# Patient Record
Sex: FEMALE | Race: WHITE | URBAN - METROPOLITAN AREA
[De-identification: names, ages, dates, MRNs, and addresses within clinical notes are randomized per-mention and may not be internally consistent; named-entity substitution may affect disease eponyms.]

---

## 2017-04-26 ENCOUNTER — OFFICE VISIT (OUTPATIENT)
Dept: FAMILY MEDICINE CLINIC | Age: 37
End: 2017-04-26

## 2017-04-26 VITALS
BODY MASS INDEX: 33.71 KG/M2 | OXYGEN SATURATION: 96 % | DIASTOLIC BLOOD PRESSURE: 82 MMHG | SYSTOLIC BLOOD PRESSURE: 130 MMHG | RESPIRATION RATE: 16 BRPM | HEART RATE: 73 BPM | WEIGHT: 214.8 LBS | TEMPERATURE: 98.4 F | HEIGHT: 67 IN

## 2017-04-26 DIAGNOSIS — L71.9 ROSACEA: ICD-10-CM

## 2017-04-26 DIAGNOSIS — R03.0 ELEVATED BLOOD PRESSURE READING IN OFFICE WITHOUT DIAGNOSIS OF HYPERTENSION: ICD-10-CM

## 2017-04-26 DIAGNOSIS — Z13.29 SCREENING FOR THYROID DISORDER: ICD-10-CM

## 2017-04-26 DIAGNOSIS — Z13.89 SCREENING FOR BLOOD OR PROTEIN IN URINE: ICD-10-CM

## 2017-04-26 DIAGNOSIS — E55.9 VITAMIN D DEFICIENCY: Chronic | ICD-10-CM

## 2017-04-26 DIAGNOSIS — Z13.220 SCREENING, LIPID: ICD-10-CM

## 2017-04-26 DIAGNOSIS — Z86.39 HISTORY OF DIABETES MELLITUS, TYPE II: ICD-10-CM

## 2017-04-26 DIAGNOSIS — Z13.1 SCREENING FOR DIABETES MELLITUS: ICD-10-CM

## 2017-04-26 DIAGNOSIS — Z13.0 SCREENING FOR ENDOCRINE/METABOLIC/IMMUNITY DISORDERS: ICD-10-CM

## 2017-04-26 DIAGNOSIS — L98.9 SKIN LESION: ICD-10-CM

## 2017-04-26 DIAGNOSIS — Z13.21 ENCOUNTER FOR VITAMIN DEFICIENCY SCREENING: ICD-10-CM

## 2017-04-26 DIAGNOSIS — Z13.228 SCREENING FOR ENDOCRINE/METABOLIC/IMMUNITY DISORDERS: ICD-10-CM

## 2017-04-26 DIAGNOSIS — O24.419 GESTATIONAL DIABETES MELLITUS (GDM), ANTEPARTUM, GESTATIONAL DIABETES METHOD OF CONTROL UNSPECIFIED: ICD-10-CM

## 2017-04-26 DIAGNOSIS — Z00.00 ROUTINE ADULT HEALTH MAINTENANCE: Primary | ICD-10-CM

## 2017-04-26 DIAGNOSIS — Z13.29 SCREENING FOR ENDOCRINE/METABOLIC/IMMUNITY DISORDERS: ICD-10-CM

## 2017-04-26 RX ORDER — GLUCOSAMINE SULFATE 1500 MG
2000 POWDER IN PACKET (EA) ORAL DAILY
COMMUNITY
End: 2017-06-28 | Stop reason: DRUGHIGH

## 2017-04-26 NOTE — MR AVS SNAPSHOT
Visit Information Date & Time Provider Department Dept. Phone Encounter #  
 4/26/2017  2:30 PM Bren Client, [address] [place] [phone] [id number] Upcoming Health Maintenance Date Due  
 FOOT EXAM Q1 1/30/1991 MICROALBUMIN Q1 1/30/1991 Pneumococcal 19-64 Medium Risk (1 of 1 - PPSV23) 1/30/2000 DTaP/Tdap/Td series (1 - Tdap) 1/30/2002 PAP AKA CERVICAL CYTOLOGY 1/30/2002 HEMOGLOBIN A1C Q6M 4/1/2016 LIPID PANEL Q1 4/1/2016 INFLUENZA AGE 9 TO ADULT 8/1/2016 EYE EXAM RETINAL OR DILATED Q1 10/3/2017 Allergies as of 4/26/2017  Review Complete On: 4/26/2017 By: Roxanne Allen LPN No Known Allergies Current Immunizations  Reviewed on 4/26/2017 No immunizations on file. Reviewed by Bren Hall MD on 4/26/2017 at  8:31 AM  
 Reviewed by Bren Hall MD on 4/26/2017 at  3:03 PM  
You Were Diagnosed With   
  
 Codes Comments Routine adult health maintenance    -  Primary ICD-10-CM: Z00.00 ICD-9-CM: V70.0 Gestational diabetes mellitus (GDM), antepartum, gestational diabetes method of control unspecified     ICD-10-CM: O23.80 ICD-9-CM: 123.94 Elevated blood pressure reading in office without diagnosis of hypertension     ICD-10-CM: R03.0 ICD-9-CM: 796.2 Rosacea     ICD-10-CM: L71.9 ICD-9-CM: 695.3 Skin lesion     ICD-10-CM: L98.9 ICD-9-CM: 709.9 Vitamin D deficiency     ICD-10-CM: E55.9 ICD-9-CM: 268.9 Vitals BP Pulse Temp Resp Height(growth percentile) Weight(growth percentile) 130/82 73 98.4 °F (36.9 °C) (Oral) 16 5' 7\" (1.702 m) 214 lb 12.8 oz (97.4 kg) SpO2 Breastfeeding? BMI OB Status Smoking Status 96% No 33.64 kg/m2 IUD Never Smoker Vitals History BMI and BSA Data Body Mass Index Body Surface Area  
 33.64 kg/m 2 2.15 m 2 Preferred Pharmacy Pharmacy Name Phone [facility] [address] [facility], [address] [phone] Your Updated Medication List  
  
   
This list is accurate as of: 4/26/17  3:07 PM.  Always use your most recent med list.  
  
  
  
  
 ibuprofen 200 mg tablet Commonly known as:  MOTRIN Take  by mouth. PNV No12-Iron-FA-DSS-OM-3 29 mg iron-1 mg -50 mg Cpkd Take  by mouth. VITAMIN D3 1,000 unit Cap Generic drug:  cholecalciferol Take 2,000 Units by mouth daily. We Performed the Following  DIABETES FOOT EXAM [HM7 Custom] Comments:  
 Diabetic foot exam performed today. REFERRAL TO DERMATOLOGY [REF19 Custom] Comments:  
 [facility] location - Please evaluate patient for mole behind left ear (peeling), growth left underarm/rib cage, rosacea/acne on face. Fair skinned. To-Do List   
 04/26/2017 Lab:  CBC WITH AUTOMATED DIFF   
  
 04/26/2017 Lab:  HEMOGLOBIN A1C WITH EAG   
  
 04/26/2017 Lab:  LIPID PANEL   
  
 04/26/2017 Lab:  METABOLIC PANEL, COMPREHENSIVE   
  
 04/26/2017 Lab:  MICROALBUMIN, UR, RAND W/ MICROALBUMIN/CREA RATIO   
  
 04/26/2017 Lab:  TSH AND FREE T4   
  
 04/26/2017 Lab:  URINALYSIS W/ RFLX MICROSCOPIC   
  
 04/26/2017 Lab:  VITAMIN D, 25 HYDROXY Referral Information Referral ID Referred By Referred To  
  
 [id number] [facility] [address] [place] [address] [address] Phone: [phone] Fax: [phone] Visits Status Start Date End Date 1 New Request 4/26/17 4/26/18 If your referral has a status of pending review or denied, additional information will be sent to support the outcome of this decision. Patient Instructions STAY UP TO DATE WITH YOUR PREVENTIVE HEALTH:    
Please review the following recommendations and if you are not sure that your healthcare is up to date, ask your provider at your next scheduled office visit. -- Yearly preventive visits (sometimes called \"physicals\") are recommended for all adults. Medicare and Medicaid do not pay for physicals. Medicare covers a yearly wellness visit that differs in many ways from a traditional physical, but is an opportunity to make sure you are maximizing the preventive services that will be covered by Medicare. Each insurance carrier will have different regulations about what services may be covered, so be sure to talk with your insurance provider before scheduling a visit. -- Colon cancer screening is recommended for all patients 48 and older with either colonoscopy (interval will vary depending on results) or yearly stool testing.   
 
-- Mammogram is recommended every 2 years for women ages 36 to 76. -- Pap smear is recommended every 3-5 years for women aged 24 to 72, unless your cervix has been surgically removed for benign reasons. -- Flu shot is recommended every fall for adults of all ages. -- Prevnar 15 is recommended at the age of 72 for all adults. -- Pneumovax is recommended one year after Prevnar 13 for all adults, but earlier if you have a history of chronic health problems (including diabetes and asthma) or smoking. -- Tetanus boosters are recommended every 10 years for adults of all ages. Medicare and Medicaid do not cover tetanus as a preventive booster, but will pay for patients to receive a booster in the event of certain injuries. INSTRUCTIONS AFTER YOUR VISIT ON 4/26/2017  
 
-- FASTING LABS  (fasting means nothing by mouth except medications with water or black coffee for at least 10 hours) were ordered to be completed at your next earliest convenience. -- Our lab does not require a scheduled appointment. You can return to the office during lab hours within the next 1-2 weeks to complete your fasting labs. -- Lab hours at [facility] are 7a-3:00pm Monday-Friday.   Please check the holiday closures below to make sure the office is open on the day you plan to return. -- LAB RESULTS can be obtained by logging into your [employer] account. If you need assistance with accessing your account, you can call the [address] [place] [address] at #[phone]. 
 
-- REFERRALS - If you were referred to a specialist or consultant today, this often needs to be authorized by your insurance company before it can be scheduled. You should be contacted within 2 weeks by the consultant's office to schedule the visit. If you do not hear from the specialist's office in that time frame, please call my office and the staff here can check on the status of your referral.   
 
 
TESTING RESULTS  
-- Lab results may become available for your review on [employer]Life before your provider has an opportunity to write you a note about results. Review of routine lab results will generally be done in 10-14 days. Please save your inquiries about results until your provider has had time to review them. -- If you have testing done outside of the office, please call to let us know the date and location that your testing was completed. Results can often be obtained faster if an inquiry is run. MEDICATION REFILLS   
 
-- Controlled substance refills (medications that require printed prescriptions for monitoring of use per [facility] guidelines) must be picked up in the office and per medical group policy will require a scheduled office visit with the provider. Calling the after hours answering service to request a controlled substance represents a violation of [facility] controlled substance policy. -- All other medication refills are to be requested by calling your pharmacy during regular office hours. The after hours physician on call is not required to respond to calls about medication refills.   It is your responsibility to keep track of when you may be running out of medication and to place refill requests at least 3 business days prior. [address] Scheduling appointments can be done at the  or by calling [phone] and selecting option #1. HOLIDAY CLOSURES:  
 
The office is closed on six major holidays each year:  New Year's Day, July 4th, Memorial Day, Labor Day, Thanksgiving, and [place] Day. Lab and X-ray services are not available on those days. Introducing [facility]! Dear Asad Mcmullen: 
Thank you for requesting a [employer] account. Our records indicate that you have previously registered for a [employer] account but its currently inactive. Please call our [employer]oma support line at [phone]. Additional Information If you have questions, please visit the Frequently Asked Questions section of the [employer] website at [email or web address] Remember, [employer] is NOT to be used for urgent needs. For medical emergencies, dial 911. Now available from your iPhone and Android! Please provide this summary of care documentation to your next provider. Your primary care clinician is listed as Amna Vela. If you have any questions after today's visit, please call [phone].

## 2017-04-26 NOTE — PROGRESS NOTES
Amador Montero is a 40 y.o. female  Pt here today for follow up visit. 1. Have you been to the ER, urgent care clinic since your last visit? Hospitalized since your last visit? No    2. Have you seen or consulted any other health care providers outside of the [address] [place] [address] since your last visit? Include any pap smears or colon screening.  Yes Where: OB/GYN

## 2017-04-26 NOTE — PROGRESS NOTES
PRE-VISIT PLANNING:     Future Appointments  Date Time Provider Luis Michael   2017 2:30 PM Austin Singh MD [place] (PCP is Austin Singh MD) is scheduled for an office visit with Austin Singh MD on 2017 for diabetes, hasn't been seen at office in 17 months. Encounter opened prior to visit to complete pre-visit planning, update and review pertinent sections in the chart. Last office visit with PCP was 11/24/15. Rooming Nurse Items:  -- Release for last diabetic eye exam  -- Urine sample for microalbumin screening  -- Instruct patient to remove socks and shoes prior to provider entering room if they are willing to have diabetic foot exam    Pending items for provider to review with patient:  -- Update fasting labs  Health Maintenance Due   Topic Date Due    MICROALBUMIN Q1  1991    Pneumococcal 19-64 Medium Risk (1 of 1 - PPSV23) 2000    DTaP/Tdap/Td series (1 - Tdap) 2002    PAP AKA CERVICAL CYTOLOGY  2002    HEMOGLOBIN A1C Q6M  2016    LIPID PANEL Q1  2016    INFLUENZA AGE 9 TO ADULT  2016          Preventive Visit - Internal [address] [address] [facility] [address]    Date of Service:  2017  Patient's Name: Daniel Colvin   Patient's :  1980     Subjective/Discussion:       Chief Complaint   Patient presents with    Physical        Daniel Colvin is a 40 y.o. female seen today for routine preventive visit/annual physical.  She  has a past medical history of Diabetes mellitus, type 2 ([address] and Pregnancy (2015). Since her last OV here she delivered a healthy baby girl via repeat C/S (in February). She did have gestational diabetes which was managed with insulin, but she notes her BG normalized immediately after delivery and she has been off treatment for several months.   She still monitors BG occasionally and has not had any BG levels > 90. She has lost a significant amount of weight (down 27#s compared to 2015). She is still breastfeeding/pumping. She is back to work. Feeling well. No postpartum depression. States she did have TDAP with VB Ob/Gyn in 3rd trimester. Had eye exam recently. Has very fair skin and a hx of flushing easily, has had worsening of redness in face that is now there all the time. Sometimes has acne on cheeks also. Has a mole behind left ear that sometimes seems to peel or come off and a skin growth on left side at bra line that is raised and fairly large.       Review of Systems (positives in bold)   CONST:   fatigue, malaise, unintentional weight change, appetite change, fevers, chills, rigors  NEURO:   headaches, auras, vision changes, seizures,     dizziness, lightheadeness, orthostasis, loss of consciousness, confusion    numbness/tingling/sensory change, focal weakness, new gait difficulty/imbalance  HEENT:  itchy eyes, runny eyes, red eyes, eye watering or discharge,     vision changes, light sensitivity, double vision    ear pain, ear pressure/popping, ear discharge/drainage, hearing change    nosebleeds, sneezing, runny nose, nasal congestion, postnasal drip, altered sense of smell    sore throat, dry mouth,voice change, hoarse voice,      jaw pain, jaw popping, toothache, dysgeusia    difficulty swallowing, oral ulcers or canker sores  CV:      chest pain, palpitations, chest wall pain, orthopnea, PND, edema  PULM:  SOB, wheezing, cough, sputum production, hemoptysis  GI:             nausea, vomiting, dry heaves, abdominal pain,     diarrhea, constipation, greasy stools, blood in stool, pale stools  :       dysuria, frequency, urgency, hematuria, incontinence, flank pain      decreased urine output, dark urine color, malodorous urine, kidney stones  MS:      focal muscle pain, myalgias, soft tissue swelling,    focal joint pain, diffuse joint aches, joint swelling/redness,     decreased ROM, joint instability, joint crepitus    neck pain, back pain  SKIN:        rashes, itching, vesicles, pustules,     cuts, open sores, nonhealing wounds, drainage,      acne, rosacea, new skin growths, changing moles or growths, skin tags, warts  ALLERGY: seasonal allergies, itchy eyes, watery eyes, red eyes,     itchy ears, ear pain/pressure, ear popping,     runny nose, sneezing, postnasal drip, sore throat  HEME:  easy bleeding/bruising, bleeding from gums, history of DVT or PE, history of ICH  PSYCH: abnormal mood swings, anxiety, insomnia, hallucinations, anhedonia,       depression, suicidal ideation, homicidal ideation, feelings of hopelessness    substance dependence or abuse, disordered eating, irritability,    difficulty focusing, distractibility, obsessions, compulsions, repetitious behaviors     Health Maintenance   Topic Date Due    MICROALBUMIN Q1  01/30/1991    Pneumococcal 19-64 Medium Risk (1 of 1 - PPSV23) 01/30/2000    DTaP/Tdap/Td series (1 - Tdap) 01/30/2002    PAP AKA CERVICAL CYTOLOGY  01/30/2002    HEMOGLOBIN A1C Q6M  04/01/2016    LIPID PANEL Q1  04/01/2016    INFLUENZA AGE 9 TO ADULT  08/01/2016    EYE EXAM RETINAL OR DILATED Q1  10/03/2017    FOOT EXAM Q1  04/26/2018       No visits with results within 12 Month(s) from this visit.   Latest known visit with results is:    Office Visit on 11/24/2015   Component Date Value Ref Range Status    WBC 11/25/2015 9.2  3.4 - 10.8 x10E3/uL Final    RBC 11/25/2015 4.04  3.77 - 5.28 x10E6/uL Final    HGB 11/25/2015 12.7  11.1 - 15.9 g/dL Final    HCT 11/25/2015 37.7  34.0 - 46.6 % Final    MCV 11/25/2015 93  79 - 97 fL Final    MCH 11/25/2015 31.4  26.6 - 33.0 pg Final    MCHC 11/25/2015 33.7  31.5 - 35.7 g/dL Final    RDW 11/25/2015 13.3  12.3 - 15.4 % Final    PLATELET 45/33/3423 530  150 - 379 x10E3/uL Final    NEUTROPHILS 11/25/2015 67  % Final    Lymphocytes 11/25/2015 27  % Final    MONOCYTES 11/25/2015 6  % Final    EOSINOPHILS 11/25/2015 0  % Final    BASOPHILS 11/25/2015 0  % Final    ABS. NEUTROPHILS 11/25/2015 6.2  1.4 - 7.0 x10E3/uL Final    Abs Lymphocytes 11/25/2015 2.5  0.7 - 3.1 x10E3/uL Final    ABS. MONOCYTES 11/25/2015 0.5  0.1 - 0.9 x10E3/uL Final    ABS. EOSINOPHILS 11/25/2015 0.0  0.0 - 0.4 x10E3/uL Final    ABS. BASOPHILS 11/25/2015 0.0  0.0 - 0.2 x10E3/uL Final    IMMATURE GRANULOCYTES 11/25/2015 0  % Final    ABS. IMM. GRANS. 11/25/2015 0.0  0.0 - 0.1 x10E3/uL Final    Folate 11/25/2015 >19.9  >3.0 ng/mL Final    Comment: A serum folate concentration of less than 3.1 ng/mL is  considered to represent clinical deficiency.  VITAMIN D, 25-HYDROXY 11/25/2015 15.0* 30.0 - 100.0 ng/mL Final    Comment: Vitamin D deficiency has been defined by the [address] [place] [address] practice guideline as a  level of serum 25-OH vitamin D less than 20 ng/mL (1,2). The Endocrine Society went on to further define vitamin D  insufficiency as a level between 21 and 29 ng/mL (2). 1. IOM ([place] of [employer]). 2010. Dietary reference     intakes for calcium and D. [facility]: The     Asthmatracker. 2. Ann MF, Cindy NC, Priscilla PARSONS, et al.     Evaluation, treatment, and prevention of vitamin D     deficiency: an Endocrine Society clinical practice     guideline. JCEM. 2011 Jul; 96(7):1911-30.  TSH 11/25/2015 0.769  0.450 - 4.500 uIU/mL Final    Vitamin B12 11/25/2015 588  211 - 946 pg/mL Final       Physical Assessment:   VS:    Visit Vitals    /82    Pulse 73    Temp 98.4 °F (36.9 °C) (Oral)    Resp 16    Ht 5' 7\" (1.702 m)    Wt 214 lb 12.8 oz (97.4 kg)    SpO2 96%    Breastfeeding No    BMI 33.64 kg/m2     No exam data present    General:   Pleasant age appropriate female well-nourished, well-groomed,  conversant, alert, in no acute distress.      Head:  Normocephalic, atraumatic  Ears:  External ears WNL, no pain with movement of pinna, no TTP of mastoid processes    External auditory canals are clear    TMs WNL bilaterally with no bulging, or erythema, no medial effusions present  Eyes:  EOMI, PERRL, no pain with eye movements    No conjunctival injection, abnormal tearing, discharge, chemosis  Mouth:  MMM, o/p WNL without membranes, exudates, ulcerations or petechiae  Nose:  External nares WNL    Nasal turbinatesWNL  Neck:  Neck supple with normal ROM for age, no thyromegaly or nodules    no LAD  Cardiovasc:   Regular rate and rhythm, no murmurs, no rubs, no gallops  Pulmonary:   Clear breath sounds bilaterally, good air movement    No wheezing, no rales, no rhonchi, normal respiratory effort  Abdomen:   Abdomen soft, nontender, nondistended, NABS, no masses  Extremities:   No edema, no tenderness with palpation of calves, warm and well-perfused distally    No synovitis or pain with ROM of knees, ankles, hips, wrists, elbows, shoulders. Neuro:   Alert, conversant, appropriate, following commands, no focal deficits. DTRs 2+/symmetric at biceps, BRs, knees, ankles    Strength testing 5/5 in all major muscle groups    Gait/balance normal     Sensation normal to light touch distally  Skin:    Cheeks are flushed/erythematous, many freckles on face/neck, well-demarcated slightly raised waxy ~.8cm papule behind left ear with varied pigment. Fleshy light brown pigmented raised skin growth left side along bra line    Psych:  Affect pleasant, mood and judgment appropriate, facies congruent with affect,    Thought process demonstrated is linear and logical    Assessment/Plan:       William Coronado was seen today for routine preventive visit. Derm referral.  Update fasting labs. Age, gender and functional status appropriate preventive screenings/measures discussed with patient. Follow up yearly for preventive visit, sooner PRN. ICD-10-CM ICD-9-CM    1. Routine adult health maintenance Z00.00 V70.0    2.  Gestational diabetes mellitus (GDM), antepartum, gestational diabetes method of control unspecified O24.419 648.83 CBC WITH AUTOMATED DIFF      METABOLIC PANEL, COMPREHENSIVE      HEMOGLOBIN A1C WITH EAG      LIPID PANEL      URINALYSIS W/ RFLX MICROSCOPIC      VITAMIN D, 25 HYDROXY      MICROALBUMIN, UR, RAND W/ MICROALBUMIN/CREA RATIO      HM DIABETES FOOT EXAM      TSH AND FREE T4   3. History of diabetes mellitus, type II Z86.39 V12.29 HEMOGLOBIN A1C WITH EAG      MICROALBUMIN, UR, RAND W/ MICROALBUMIN/CREA RATIO      HM DIABETES FOOT EXAM   4. Lactating mother Z39.1 V24.1 PNV No12-Iron-FA-DSS-OM-3 29 mg iron-1 mg -50 mg CPKD   5. Elevated blood pressure reading in office without diagnosis of hypertension R03.0 796.2 CBC WITH AUTOMATED DIFF      METABOLIC PANEL, COMPREHENSIVE      URINALYSIS W/ RFLX MICROSCOPIC      TSH AND FREE T4   6. Rosacea L71.9 695.3 REFERRAL TO DERMATOLOGY   7. Skin lesion L98.9 709.9 REFERRAL TO DERMATOLOGY   8. Vitamin D deficiency E55.9 268.9 VITAMIN D, 25 HYDROXY      cholecalciferol (VITAMIN D3) 1,000 unit cap   9. Screening for endocrine/metabolic/immunity disorders Z13.29 V77.99 CBC WITH AUTOMATED DIFF    O93.312  METABOLIC PANEL, COMPREHENSIVE    Z13.0  HEMOGLOBIN A1C WITH EAG      LIPID PANEL      URINALYSIS W/ RFLX MICROSCOPIC      VITAMIN D, 25 HYDROXY      MICROALBUMIN, UR, RAND W/ MICROALBUMIN/CREA RATIO      TSH AND FREE T4   10. Screening for diabetes mellitus Z13.1 V77.1 HEMOGLOBIN A1C WITH EAG   11. Screening, lipid Z13.220 V77.91 LIPID PANEL   12. Screening for blood or protein in urine Z13.89 V82.9 URINALYSIS W/ RFLX MICROSCOPIC      MICROALBUMIN, UR, RAND W/ MICROALBUMIN/CREA RATIO   13. Screening for thyroid disorder Z13.29 V77.0 TSH AND FREE T4   14. Encounter for vitamin deficiency screening Z13.21 V77.99 VITAMIN D, 25 HYDROXY       Body mass index is 33.64 kg/(m^2). Discussed the patient's above normal BMI with her.   I have recommended the following interventions and follow up:  Weight loss from baseline weight. 30 minutes of cardiovascular exercise daily, reduction in simple carbohydrates, increase in dietary fiber, adequate water intake to stay hydrated/keep urine clear to light yellow. Follow up at next OV. The patient states understanding/agreement with the treatment plan. Patient's questions were answered. Kris Lign MD - Internal Medicine  4/26/2017, 2:51 PM  3 [facility]    Patient Care Team:  Kris Ling MD as PCP - General (Internal Medicine)  Danyelle Leslie MD as Consulting Provider (Obstetrics & Gynecology)  Rosemary Orr RN as Nurse Navigator (Internal Medicine)  Iris Mondragon MD as Consulting Provider (Maternal . Fetal Medicine)     Problem List:      Patient Active Problem List   Diagnosis Code    Controlled type 2 diabetes mellitus without complication, without long-term current use of insulin ([facility] E11.9    Pregnancy Z33.1    Vitamin D deficiency E55.9    Gestational diabetes mellitus (GDM), antepartum O24.419    Routine adult health maintenance Z00.00       Medications:     Current Outpatient Prescriptions   Medication Sig    PNV No12-Iron-FA-DSS-OM-3 29 mg iron-1 mg -50 mg CPKD Take  by mouth.  cholecalciferol (VITAMIN D3) 1,000 unit cap Take 2,000 Units by mouth daily.  ibuprofen (MOTRIN) 200 mg tablet Take  by mouth. No current facility-administered medications for this visit.         Additional History:     Past Medical History:   Diagnosis Date    Diabetes mellitus, type 2 ([facility]     New diagnosis 4/2015 with HbA1C of 7.7%    Pregnancy 11/24/2015    JUAN 7/12/15 based on LMP, Dr. Cedric Centeno. 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 Family History   Problem Relation Age of Onset    Diabetes Maternal Aunt      No Known Allergies         This document may have been created with the aid of dictation software. In spite of review and editing, text may contain errors, particularly phonetic errors.

## 2017-04-26 NOTE — PROGRESS NOTES
Pt does not recall Dr/location of eye exam. Pt took records release form home and will return with info for requesting records.

## 2017-04-26 NOTE — PATIENT INSTRUCTIONS
STAY UP TO DATE WITH YOUR PREVENTIVE HEALTH:     Please review the following recommendations and if you are not sure that your healthcare is up to date, ask your provider at your next scheduled office visit. -- Yearly preventive visits (sometimes called \"physicals\") are recommended for all adults. Medicare and Medicaid do not pay for physicals. Medicare covers a yearly wellness visit that differs in many ways from a traditional physical, but is an opportunity to make sure you are maximizing the preventive services that will be covered by Medicare. Each insurance carrier will have different regulations about what services may be covered, so be sure to talk with your insurance provider before scheduling a visit. -- Colon cancer screening is recommended for all patients 48 and older with either colonoscopy (interval will vary depending on results) or yearly stool testing.      -- Mammogram is recommended every 2 years for women ages 36 to 76. -- Pap smear is recommended every 3-5 years for women aged 24 to 72, unless your cervix has been surgically removed for benign reasons. -- Flu shot is recommended every fall for adults of all ages. -- Prevnar 15 is recommended at the age of 72 for all adults. -- Pneumovax is recommended one year after Prevnar 13 for all adults, but earlier if you have a history of chronic health problems (including diabetes and asthma) or smoking. -- Tetanus boosters are recommended every 10 years for adults of all ages. Medicare and Medicaid do not cover tetanus as a preventive booster, but will pay for patients to receive a booster in the event of certain injuries. INSTRUCTIONS AFTER YOUR VISIT ON 4/26/2017     -- FASTING LABS  (fasting means nothing by mouth except medications with water or black coffee for at least 10 hours) were ordered to be completed at your next earliest convenience. -- Our lab does not require a scheduled appointment.   You can return to the office during lab hours within the next 1-2 weeks to complete your fasting labs. -- Lab hours at [facility] are 7a-3:00pm Monday-Friday. Please check the holiday closures below to make sure the office is open on the day you plan to return. -- LAB RESULTS can be obtained by logging into your [facility] account. If you need assistance with accessing your account, you can call the [address] [place], [place] [address] at #[phone].    -- REFERRALS - If you were referred to a specialist or consultant today, this often needs to be authorized by your insurance company before it can be scheduled. You should be contacted within 2 weeks by the consultant's office to schedule the visit. If you do not hear from the specialist's office in that time frame, please call my office and the staff here can check on the status of your referral.        TESTING RESULTS   -- Lab results may become available for your review on [employer] before your provider has an opportunity to write you a note about results. Review of routine lab results will generally be done in 10-14 days. Please save your inquiries about results until your provider has had time to review them. -- If you have testing done outside of the office, please call to let us know the date and location that your testing was completed. Results can often be obtained faster if an inquiry is run. MEDICATION REFILLS      -- Controlled substance refills (medications that require printed prescriptions for monitoring of use per [facility] guidelines) must be picked up in the office and per medical group policy will require a scheduled office visit with the provider. Calling the after hours answering service to request a controlled substance represents a violation of [facility] controlled substance policy. -- All other medication refills are to be requested by calling your pharmacy during regular office hours.   The after hours physician on call is not required to respond to calls about medication refills. It is your responsibility to keep track of when you may be running out of medication and to place refill requests at least 3 business days prior. [facility]      Scheduling appointments can be done at the  or by calling [phone] and selecting option #1. HOLIDAY CLOSURES:     The office is closed on six major holidays each year:  New Year's Day, July 4th, Memorial Day, Labor Day, Thanksgiving, and Maris Day. Lab and X-ray services are not available on those days.

## 2017-06-12 ENCOUNTER — HOSPITAL ENCOUNTER (OUTPATIENT)
Dept: LAB | Age: 37
Discharge: HOME | End: 2017-06-12

## 2017-06-12 PROCEDURE — 99001 SPECIMEN HANDLING PT-LAB: CPT | Performed by: INTERNAL MEDICINE

## 2017-06-13 LAB
25(OH)D3+25(OH)D2 SERPL-MCNC: 29.7 NG/ML (ref 30–100)
ALBUMIN SERPL-MCNC: 4.8 G/DL (ref 3.5–5.5)
ALBUMIN/CREAT UR: 11.5 MG/G CREAT (ref 0–30)
ALBUMIN/GLOB SERPL: 1.7 {RATIO} (ref 1.2–2.2)
ALP SERPL-CCNC: 69 IU/L (ref 39–117)
ALT SERPL-CCNC: 16 IU/L (ref 0–32)
APPEARANCE UR: ABNORMAL
AST SERPL-CCNC: 10 IU/L (ref 0–40)
BACTERIA #/AREA URNS HPF: NORMAL /[HPF]
BASOPHILS # BLD AUTO: 0 X10E3/UL (ref 0–0.2)
BASOPHILS NFR BLD AUTO: 1 %
BILIRUB SERPL-MCNC: 0.3 MG/DL (ref 0–1.2)
BILIRUB UR QL STRIP: NEGATIVE
BUN SERPL-MCNC: 16 MG/DL (ref 6–20)
BUN/CREAT SERPL: 24 (ref 9–23)
CALCIUM SERPL-MCNC: 9.6 MG/DL (ref 8.7–10.2)
CASTS URNS QL MICRO: NORMAL /LPF
CHLORIDE SERPL-SCNC: 103 MMOL/L (ref 96–106)
CHOLEST SERPL-MCNC: 151 MG/DL (ref 100–199)
CO2 SERPL-SCNC: 25 MMOL/L (ref 18–29)
COLOR UR: YELLOW
CREAT SERPL-MCNC: 0.68 MG/DL (ref 0.57–1)
CREAT UR-MCNC: 222.9 MG/DL
CREATININE, EXTERNAL: 0.68
EOSINOPHIL # BLD AUTO: 0 X10E3/UL (ref 0–0.4)
EOSINOPHIL NFR BLD AUTO: 1 %
EPI CELLS #/AREA URNS HPF: NORMAL /HPF
ERYTHROCYTE [DISTWIDTH] IN BLOOD BY AUTOMATED COUNT: 13.5 % (ref 12.3–15.4)
EST. AVERAGE GLUCOSE BLD GHB EST-MCNC: 97 MG/DL
GLOBULIN SER CALC-MCNC: 2.8 G/DL (ref 1.5–4.5)
GLUCOSE SERPL-MCNC: 82 MG/DL (ref 65–99)
GLUCOSE UR QL: NEGATIVE
HBA1C MFR BLD HPLC: 5 %
HBA1C MFR BLD: 5 % (ref 4.8–5.6)
HCT VFR BLD AUTO: 39.4 % (ref 34–46.6)
HDLC SERPL-MCNC: 56 MG/DL
HGB BLD-MCNC: 13 G/DL (ref 11.1–15.9)
HGB UR QL STRIP: NEGATIVE
IMM GRANULOCYTES # BLD: 0 X10E3/UL (ref 0–0.1)
IMM GRANULOCYTES NFR BLD: 0 %
INTERPRETATION, 910389: NORMAL
KETONES UR QL STRIP: NEGATIVE
LDL-C, EXTERNAL: 85
LDLC SERPL CALC-MCNC: 85 MG/DL (ref 0–99)
LEUKOCYTE ESTERASE UR QL STRIP: ABNORMAL
LYMPHOCYTES # BLD AUTO: 2.2 X10E3/UL (ref 0.7–3.1)
LYMPHOCYTES NFR BLD AUTO: 31 %
MCH RBC QN AUTO: 31.8 PG (ref 26.6–33)
MCHC RBC AUTO-ENTMCNC: 33 G/DL (ref 31.5–35.7)
MCV RBC AUTO: 96 FL (ref 79–97)
MICRO URNS: ABNORMAL
MICROALBUMIN UR TEST STR-MCNC: 25.7 MG/DL
MICROALBUMIN UR-MCNC: 25.7 UG/ML
MONOCYTES # BLD AUTO: 0.5 X10E3/UL (ref 0.1–0.9)
MONOCYTES NFR BLD AUTO: 7 %
MUCOUS THREADS URNS QL MICRO: PRESENT
NEUTROPHILS # BLD AUTO: 4.2 X10E3/UL (ref 1.4–7)
NEUTROPHILS NFR BLD AUTO: 60 %
NITRITE UR QL STRIP: NEGATIVE
PH UR STRIP: 5.5 [PH] (ref 5–7.5)
PLATELET # BLD AUTO: 216 X10E3/UL (ref 150–379)
POTASSIUM SERPL-SCNC: 4.3 MMOL/L (ref 3.5–5.2)
PROT SERPL-MCNC: 7.6 G/DL (ref 6–8.5)
PROT UR QL STRIP: NEGATIVE
RBC # BLD AUTO: 4.09 X10E6/UL (ref 3.77–5.28)
RBC #/AREA URNS HPF: NORMAL /HPF
SODIUM SERPL-SCNC: 143 MMOL/L (ref 134–144)
SP GR UR: >=1.03 (ref 1–1.03)
T4 FREE SERPL-MCNC: 1.12 NG/DL (ref 0.82–1.77)
TRIGL SERPL-MCNC: 52 MG/DL (ref 0–149)
TSH SERPL DL<=0.005 MIU/L-ACNC: 0.81 UIU/ML (ref 0.45–4.5)
UROBILINOGEN UR STRIP-MCNC: 0.2 MG/DL (ref 0.2–1)
VLDLC SERPL CALC-MCNC: 10 MG/DL (ref 5–40)
WBC # BLD AUTO: 6.9 X10E3/UL (ref 3.4–10.8)
WBC #/AREA URNS HPF: NORMAL /HPF

## 2017-06-28 RX ORDER — CHOLECALCIFEROL (VITAMIN D3) 125 MCG
5000 CAPSULE ORAL DAILY
Qty: 90 CAP | Refills: 3 | COMMUNITY

## 2017-06-28 NOTE — PROGRESS NOTES
Aaron Camarillo  is not active on [employer]. Please contact pt with results and offer to mail a copy of results letter. -- Vitamin D level is a little low at 29.7 (normal is ). Vitamin D is important for bone health as it directs your body to store calcium. Start over the counter daily vitamin D supplement (5000 international units daily). Recheck in 1 year. Labs show normal thyroid testing, blood counts, kidney testing, electrolytes, liver testing, fasting blood sugar, cholesterol (much improved!), and average blood sugar. Hemoglobin A1C solidly in normal range at 5.0%. Keep up the good work!

## 2017-08-10 DIAGNOSIS — Z13.89 SCREENING FOR BLOOD OR PROTEIN IN URINE: ICD-10-CM

## 2017-08-10 DIAGNOSIS — Z13.29 SCREENING FOR THYROID DISORDER: ICD-10-CM

## 2017-08-10 DIAGNOSIS — Z13.0 SCREENING FOR ENDOCRINE/METABOLIC/IMMUNITY DISORDERS: ICD-10-CM

## 2017-08-10 DIAGNOSIS — Z13.29 SCREENING FOR ENDOCRINE/METABOLIC/IMMUNITY DISORDERS: ICD-10-CM

## 2017-08-10 DIAGNOSIS — Z13.21 ENCOUNTER FOR VITAMIN DEFICIENCY SCREENING: ICD-10-CM

## 2017-08-10 DIAGNOSIS — Z86.39 HISTORY OF DIABETES MELLITUS, TYPE II: ICD-10-CM

## 2017-08-10 DIAGNOSIS — Z13.220 SCREENING, LIPID: ICD-10-CM

## 2017-08-10 DIAGNOSIS — R03.0 ELEVATED BLOOD PRESSURE READING IN OFFICE WITHOUT DIAGNOSIS OF HYPERTENSION: ICD-10-CM

## 2017-08-10 DIAGNOSIS — Z13.228 SCREENING FOR ENDOCRINE/METABOLIC/IMMUNITY DISORDERS: ICD-10-CM

## 2017-08-10 DIAGNOSIS — E55.9 VITAMIN D DEFICIENCY: Chronic | ICD-10-CM

## 2017-08-10 DIAGNOSIS — Z13.1 SCREENING FOR DIABETES MELLITUS: ICD-10-CM

## 2017-08-10 DIAGNOSIS — O24.419 GESTATIONAL DIABETES MELLITUS (GDM), ANTEPARTUM, GESTATIONAL DIABETES METHOD OF CONTROL UNSPECIFIED: ICD-10-CM

## 2018-04-12 PROBLEM — O24.419 GESTATIONAL DIABETES MELLITUS (GDM), ANTEPARTUM: Status: RESOLVED | Noted: 2017-04-26 | Resolved: 2018-04-12

## 2018-04-12 NOTE — PROGRESS NOTES
PRE-VISIT PLANNING:     Future Appointments  Date Time Provider Luis Michael   2018 8:30 AM Beth Sánchez MD Cholo Reynolds (PCP is Beth Sánchez MD) is scheduled for an office visit with Beth Sánchez MD on 2018 for annual preventive visit. Encounter opened prior to visit to complete pre-visit planning, update and review pertinent sections in the chart. Last office visit with PCP was 2017. Rooming Nurse Items:  -- TDAP  -- Release for last Pap smear report    Pending items for provider to review with patient:  -- Fasting labs done in 2017  Health Maintenance Due   Topic Date Due    DTaP/Tdap/Td series (1 - Tdap) 2002    PAP AKA CERVICAL CYTOLOGY  2002          Internal Medicine/Primary Care  Preventive Care Visit  [address] [facility]  [address] [place] [address], [address]  Phone [phone]; Fax [phone]    Date of Service:  2018  Patient's Name & : Ovi Nettles 1980    Assessment/Plan:       Ovi Nettles was seen today for annual preventive visit. Weight has increased since stopping breastfeeding. Recent nausea with one episode of emesis. Has IUD in place. Urine pregnancy test in office today was negative. Update fasting labs at next earliest convenience. HM recommendations reviewed with patient. Body mass index is 37.76 kg/(m^2). Discussed achieving/maintaining healthy weight and BMI, making lifestyle changes with focus on reduction in processed carbs as well as 30 min of exercise daily. Follow up BMI/weight at next visit.      Orders Placed This Encounter    CBC WITH AUTOMATED DIFF    METABOLIC PANEL, COMPREHENSIVE    LIPID PANEL    TSH AND FREE T4    MICROALBUMIN, UR, RAND W/ MICROALB/CREAT RATIO    URINALYSIS W/ RFLX MICROSCOPIC    VITAMIN D, 25 HYDROXY    HEMOGLOBIN A1C WITH EAG    AMB POC URINE PREGNANCY TEST, VISUAL COLOR COMPARISON Patient Instructions       Complete fasting labs at [facility] at your next earliest convenience. If labs are normal, follow up with Love Gardner MD in 1 year for preventive visit (30m). Arrive 15 minutes prior to scheduled appointment time for check-in. Call and request an earlier appointment if you have questions or concerns. A HEALTHY LIFESTYLE IS RECOMMENDED FOR EVERYONE! Your doctor recommends a lifestyle that incorporates daily exercise and a diet focused on whole, unprocessed foods. Aim to follow a diet of 2/3 non-starchy vegetables and low glycemic impact fruits and 1/3 lean proteins. Avoid processed/refined carbohydrates (especially bread products, bakery items, sugary drinks, cereals, crackers and sweets). Minimum 30 minutes of cardio and weight training/resistance exercise daily to build muscle, reduce insulin sensitivity and increase resting fat & calorie burning capacity. TESTING RESULTS   Normal results will be released to [employer] or sent by [address] mail. [address] [facility] #[phone]. Results of tests performed at outside facilities will not appear in [address] If you have questions about your results, please feel free to schedule a follow up appointment to discuss your concerns with your PCP. MEDICATION REFILLS      -- Medication refills should be requested at least 2 business days in advance through your pharmacy. Refills will not be provided by the after hours/on call provider. The patient states understanding of recommended treatment plan. Love Gardner MD - Internal Medicine  4/13/2018, 7:52 PM    Subjective/Discussion:       CHIEF COMPLAINT:  Preventive visit/CPE    Nkechi Louis is a 41 y.o. female presenting today for annual preventive visit. Has had a lot of burping and recent nausea since pregnancy, threw up once 3 weeks ago (felt better after). Has IUD. No heartburn. No diet changes, stopped drinking soda.   Her daughter is now 14 months old.      Discussed regular exercise: Y - walking  Discussed healthy diet:  Y   Discussed sun protection:  Y - recent skin lesion removed from her back requiring re-excision  Discussed always wearing seatbelt in automobile:  Y  Discussed distracted driving, advised not to text while driving:  Y  Change to family history: N     Body mass index is 37.76 kg/(m^2). Weight has increased 27#s since last visit. Health Maintenance   Topic Date Due    DTaP/Tdap/Td series (1 - Tdap) 01/30/2002    PAP AKA CERVICAL CYTOLOGY  01/30/2002    Influenza Age 9 to Adult  08/01/2018 (Originally 8/1/2017)     Patient Active Problem List    Diagnosis    BMI 37.0-37.9, adult     4/13/18:  BMI 37.76      Nausea     4/13/18:  Recent intermittent nausea with single episode of vomiting. No heartburn, but some belching. Urine pregnancy test negative.  History of diabetes mellitus, type II     Resolved with weight loss - will monitor only yearly.  Vitamin D deficiency    Routine adult health maintenance    Pregnancy     Review of Systems   Constitutional: Negative for chills, diaphoresis, fever, malaise/fatigue and weight loss. HENT: Negative for congestion, ear discharge, ear pain, hearing loss, nosebleeds, sinus pain, sore throat and tinnitus. Eyes: Negative for blurred vision, double vision, photophobia, pain, discharge and redness. Respiratory: Negative for cough, hemoptysis, sputum production, shortness of breath, wheezing and stridor. Cardiovascular: Negative for chest pain, palpitations, orthopnea, claudication, leg swelling and PND. Gastrointestinal: Positive for nausea and vomiting. Negative for abdominal pain, blood in stool, constipation, diarrhea, heartburn and melena.        +eruptation   Genitourinary: Negative for dysuria, flank pain, frequency, hematuria and urgency. Musculoskeletal: Negative for back pain, falls, joint pain, myalgias and neck pain. Skin: Negative for itching and rash. Neurological: Negative for dizziness, tingling, tremors, sensory change, speech change, focal weakness, seizures, loss of consciousness, weakness and headaches. Endo/Heme/Allergies: Negative for polydipsia. Does not bruise/bleed easily. +weight gain   Psychiatric/Behavioral: Negative for depression, hallucinations, memory loss, substance abuse and suicidal ideas. The patient is not nervous/anxious and does not have insomnia. Objective:     /80 (BP 1 Location: Left arm, BP Patient Position: Sitting)  Pulse 84  Temp 98.1 °F (36.7 °C) (Oral)   Resp 20  Ht 5' 7\" (1.702 m)  Wt 241 lb 1.6 oz (109.4 kg)  SpO2 97%  BMI 37.76 kg/m2    Physical Exam   Constitutional: She is oriented to person, place, and time. She appears well-developed and well-nourished. No distress. Pleasant, age appropriate, severely obese female seated comfortably in exam room chair. HENT:   Head: Normocephalic and atraumatic. Right Ear: External ear normal.   Left Ear: External ear normal.   Mouth/Throat: Oropharynx is clear and moist.   Eyes: EOM are normal. Pupils are equal, round, and reactive to light. Right eye exhibits no discharge. Left eye exhibits no discharge. No scleral icterus. Neck: Normal range of motion. Neck supple. No JVD present. No thyromegaly present. Cardiovascular: Normal rate, regular rhythm, normal heart sounds and intact distal pulses. Exam reveals no gallop and no friction rub. No murmur heard. Pulmonary/Chest: Effort normal and breath sounds normal. No stridor. No respiratory distress. She has no wheezes. She has no rales. She exhibits no tenderness. Abdominal: Soft. Bowel sounds are normal. She exhibits no distension and no mass. There is no tenderness. There is no rebound and no guarding. Musculoskeletal: Normal range of motion. She exhibits no edema, tenderness or deformity. Neurological: She is alert and oriented to person, place, and time. No cranial nerve deficit.  She exhibits normal muscle tone. Coordination normal.   Skin: Skin is warm and dry. No rash noted. She is not diaphoretic. No erythema. No pallor. Psychiatric: She has a normal mood and affect. Her behavior is normal. Judgment and thought content normal.       Laboratory/Testing Results:     Office Visit on 04/13/2018   Component Date Value Ref Range Status    VALID INTERNAL CONTROL POC 04/13/2018 Yes   Final    HCG urine, Ql. (POC) 04/13/2018 Negative  Negative Final       Additional History     Past Medical History:   Diagnosis Date    Gestational diabetes mellitus (GDM), antepartum 4/26/2017    History of type 2 diabetes mellitus     Resolved with weight loss - will monitor only yearly.  History of type 2 diabetes mellitus     Resolved with weight loss - will monitor only yearly.  Pregnancy 11/24/2015    JUAN 7/12/15 based on LMP, Dr. Julianne Sosa. Susan  office     Past Surgical History:   Procedure Laterality Date   Shruthi Shyam GYN  2006 & 2007    c-sec    HX GYN  2014    [facility]     Social History   Substance Use Topics    Smoking status: Never Smoker    Smokeless tobacco: Never Used    Alcohol use No     Current Outpatient Prescriptions   Medication Sig    cholecalciferol (VITAMIN D3) 5,000 unit capsule Take 1 Cap by mouth daily.  PNV No12-Iron-FA-DSS-OM-3 29 mg iron-1 mg -50 mg CPKD Take  by mouth.  ibuprofen (MOTRIN) 200 mg tablet Take  by mouth. No current facility-administered medications for this visit. No Known Allergies    Encounter Diagnoses:     Encounter Diagnoses     ICD-10-CM ICD-9-CM   1. Routine adult health maintenance Z00.00 V70.0   2. Screening for endocrine, nutritional, metabolic and immunity disorder Z13.29 V77.99    Z13.21     Z13.228     Z13.0    3. Screening for diabetes mellitus Z13.1 V77.1   4. Screening, lipid Z13.220 V77.91   5. Screening for thyroid disorder Z13.29 V77.0   6. Screening for blood or protein in urine Z13.89 V82.9   7.  Encounter for vitamin deficiency screening Z13.21 V77.99   8. BMI 37.0-37.9, adult Z68.37 V85.37   9. Nausea R11.0 787.02              This document may have been created with the aid of dictation software. Text may contain errors, particularly phonetic errors.

## 2018-04-13 ENCOUNTER — OFFICE VISIT (OUTPATIENT)
Dept: FAMILY MEDICINE CLINIC | Age: 38
End: 2018-04-13

## 2018-04-13 VITALS
HEART RATE: 84 BPM | TEMPERATURE: 98.1 F | SYSTOLIC BLOOD PRESSURE: 130 MMHG | RESPIRATION RATE: 20 BRPM | WEIGHT: 241.1 LBS | DIASTOLIC BLOOD PRESSURE: 80 MMHG | HEIGHT: 67 IN | OXYGEN SATURATION: 97 % | BODY MASS INDEX: 37.84 KG/M2

## 2018-04-13 DIAGNOSIS — Z13.29 SCREENING FOR THYROID DISORDER: ICD-10-CM

## 2018-04-13 DIAGNOSIS — Z13.0 SCREENING FOR ENDOCRINE, NUTRITIONAL, METABOLIC AND IMMUNITY DISORDER: ICD-10-CM

## 2018-04-13 DIAGNOSIS — Z13.220 SCREENING, LIPID: ICD-10-CM

## 2018-04-13 DIAGNOSIS — Z13.228 SCREENING FOR ENDOCRINE, NUTRITIONAL, METABOLIC AND IMMUNITY DISORDER: ICD-10-CM

## 2018-04-13 DIAGNOSIS — R11.0 NAUSEA: ICD-10-CM

## 2018-04-13 DIAGNOSIS — Z13.21 SCREENING FOR ENDOCRINE, NUTRITIONAL, METABOLIC AND IMMUNITY DISORDER: ICD-10-CM

## 2018-04-13 DIAGNOSIS — Z00.00 ROUTINE ADULT HEALTH MAINTENANCE: Primary | ICD-10-CM

## 2018-04-13 DIAGNOSIS — Z13.21 ENCOUNTER FOR VITAMIN DEFICIENCY SCREENING: ICD-10-CM

## 2018-04-13 DIAGNOSIS — Z13.29 SCREENING FOR ENDOCRINE, NUTRITIONAL, METABOLIC AND IMMUNITY DISORDER: ICD-10-CM

## 2018-04-13 DIAGNOSIS — Z13.1 SCREENING FOR DIABETES MELLITUS: ICD-10-CM

## 2018-04-13 DIAGNOSIS — Z13.89 SCREENING FOR BLOOD OR PROTEIN IN URINE: ICD-10-CM

## 2018-04-13 LAB
HCG URINE, QL. (POC): NEGATIVE
VALID INTERNAL CONTROL?: YES

## 2018-04-13 NOTE — PATIENT INSTRUCTIONS
Complete fasting labs at [facility] at your next earliest convenience. If labs are normal, follow up with Lai Broderick MD in 1 year for preventive visit (30m). Arrive 15 minutes prior to scheduled appointment time for check-in. Call and request an earlier appointment if you have questions or concerns. A HEALTHY LIFESTYLE IS RECOMMENDED FOR EVERYONE! Your doctor recommends a lifestyle that incorporates daily exercise and a diet focused on whole, unprocessed foods. Aim to follow a diet of 2/3 non-starchy vegetables and low glycemic impact fruits and 1/3 lean proteins. Avoid processed/refined carbohydrates (especially bread products, bakery items, sugary drinks, cereals, crackers and sweets). Minimum 30 minutes of cardio and weight training/resistance exercise daily to build muscle, reduce insulin sensitivity and increase resting fat & calorie burning capacity. TESTING RESULTS   Normal results will be released to [facility]) or sent by [address] mail. [address] [facility] #[phone]. Results of tests performed at outside facilities will not appear in [address] If you have questions about your results, please feel free to schedule a follow up appointment to discuss your concerns with your PCP. MEDICATION REFILLS      -- Medication refills should be requested at least 2 business days in advance through your pharmacy. Refills will not be provided by the after hours/on call provider.

## 2018-04-13 NOTE — PROGRESS NOTES
Remy Silva is a 41 y.o. female (: 1980) presenting to address:    Chief Complaint   Patient presents with    Diabetes       Vitals:    18 0830   BP: 130/80   Pulse: 84   Resp: 20   Temp: 98.1 °F (36.7 °C)   TempSrc: Oral   SpO2: 97%   Weight: 241 lb 1.6 oz (109.4 kg)   Height: 5' 7\" (1.702 m)   PainSc:   0 - No pain       Hearing/Vision:   No exam data present    Learning Assessment:     Learning Assessment 2015   PRIMARY LEARNER Patient   HIGHEST LEVEL OF EDUCATION - PRIMARY LEARNER  GRADUATED HIGH SCHOOL OR GED   BARRIERS PRIMARY LEARNER NONE   CO-LEARNER CAREGIVER No   CO-LEARNER NAME na   BARRIERS CO-LEARNER NONE   PRIMARY LANGUAGE ENGLISH   PRIMARY LANGUAGE CO-LEARNER OTHER (COMMENTS)   LEARNER PREFERENCE PRIMARY OTHER (COMMENT)   ANSWERED BY patient   RELATIONSHIP SELF     Depression Screening:     PHQ over the last two weeks 2018   Little interest or pleasure in doing things Not at all   Feeling down, depressed or hopeless Not at all   Total Score PHQ 2 0     Fall Risk Assessment:   No flowsheet data found. Abuse Screening:     Abuse Screening Questionnaire 2018   Do you ever feel afraid of your partner? N   Are you in a relationship with someone who physically or mentally threatens you? N   Is it safe for you to go home? Y     Coordination of Care Questionaire:   1. Have you been to the ER, urgent care clinic since your last visit? Hospitalized since your last visit? NO    2. Have you seen or consulted any other health care providers outside of the [address] [place] [address] since your last visit? Include any pap smears or colon screening. YES  Derm    Advanced Directive:   1. Do you have an Advanced Directive? YES    2. Would you like information on Advanced Directives?  NO

## 2018-04-13 NOTE — MR AVS SNAPSHOT
[address] [place] [address] [address] [facility] [address] [phone] Patient: Remy Silva MRN: [id number] MYI:[id number]/88/[id number] Visit Information Date & Time Provider Department Dept. Phone Encounter #  
 4/13/2018  8:30 AM Kristal Apodaca, [address] [phone] [id number] Upcoming Health Maintenance Date Due DTaP/Tdap/Td series (1 - Tdap) 1/30/2002 PAP AKA CERVICAL CYTOLOGY 1/30/2002 Influenza Age 5 to Adult 8/1/2018* *Topic was postponed. The date shown is not the original due date. Allergies as of 4/13/2018  Review Complete On: 4/13/2018 By: Branden Jones LPN No Known Allergies Current Immunizations  Reviewed on 4/26/2017 Name Date Tdap 12/13/2016 Not reviewed this visit You Were Diagnosed With   
  
 Codes Comments Routine adult health maintenance    -  Primary ICD-10-CM: Z00.00 ICD-9-CM: V70.0 Screening for endocrine, nutritional, metabolic and immunity disorder     ICD-10-CM: Z13.29, Z13.21, Z13.228, Z13.0 ICD-9-CM: V77.99 Screening for diabetes mellitus     ICD-10-CM: Z13.1 ICD-9-CM: V77.1 Screening, lipid     ICD-10-CM: G49.086 ICD-9-CM: V77.91 Screening for thyroid disorder     ICD-10-CM: Z13.29 ICD-9-CM: V77.0 Screening for blood or protein in urine     ICD-10-CM: Z13.89 ICD-9-CM: V82.9 Encounter for vitamin deficiency screening     ICD-10-CM: Z13.21 ICD-9-CM: V77.99 BMI 33.0-33.9,adult     ICD-10-CM: B45.38 
ICD-9-CM: V85.33 Nausea     ICD-10-CM: R11.0 ICD-9-CM: 787.02 Vitals BP Pulse Temp Resp Height(growth percentile) Weight(growth percentile) 130/80 (BP 1 Location: Left arm, BP Patient Position: Sitting) 84 98.1 °F (36.7 °C) (Oral) 20 5' 7\" (1.702 m) 241 lb 1.6 oz (109.4 kg) SpO2 BMI OB Status Smoking Status 97% 37.76 kg/m2 IUD Never Smoker Vitals History BMI and BSA Data Body Mass Index Body Surface Area  
 37.76 kg/m 2 2.27 m 2 Preferred Pharmacy Pharmacy Name Phone [facility], [address] [phone] Your Updated Medication List  
  
   
This list is accurate as of 4/13/18  8:55 AM.  Always use your most recent med list.  
  
  
  
  
 cholecalciferol 5,000 unit capsule Commonly known as:  VITAMIN D3 Take 1 Cap by mouth daily. ibuprofen 200 mg tablet Commonly known as:  MOTRIN Take  by mouth. PNV No12-Iron-FA-DSS-OM-3 29 mg iron-1 mg -50 mg Cpkd Take  by mouth. We Performed the Following AMB POC URINE PREGNANCY TEST, VISUAL COLOR COMPARISON [29379 CPT(R)] To-Do List   
 04/13/2018 Lab:  CBC WITH AUTOMATED DIFF   
  
 04/13/2018 Lab:  HEMOGLOBIN A1C WITH EAG   
  
 04/13/2018 Lab:  LIPID PANEL   
  
 04/13/2018 Lab:  METABOLIC PANEL, COMPREHENSIVE   
  
 04/13/2018 Lab:  MICROALBUMIN, UR, RAND W/ MICROALB/CREAT RATIO   
  
 04/13/2018 Lab:  TSH AND FREE T4   
  
 04/13/2018 Lab:  URINALYSIS W/ RFLX MICROSCOPIC   
  
 04/13/2018 Lab:  VITAMIN D, 25 HYDROXY Patient Instructions Complete fasting labs at [facility] at your next earliest convenience. If labs are normal, follow up with Beth Sánchez MD in 1 year for preventive visit (30m). Arrive 15 minutes prior to scheduled appointment time for check-in. Call and request an earlier appointment if you have questions or concerns. A HEALTHY LIFESTYLE IS RECOMMENDED FOR EVERYONE! Your doctor recommends a lifestyle that incorporates daily exercise and a diet focused on whole, unprocessed foods. Aim to follow a diet of 2/3 non-starchy vegetables and low glycemic impact fruits and 1/3 lean proteins. Avoid processed/refined carbohydrates (especially bread products, bakery items, sugary drinks, cereals, crackers and sweets). Minimum 30 minutes of cardio and weight training/resistance exercise daily to build muscle, reduce insulin sensitivity and increase resting fat & calorie burning capacity. TESTING RESULTS Normal results will be released to [employer] or sent by [address] mail. [address] [facility] #[phone]. Results of tests performed at outside facilities will not appear in [address] If you have questions about your results, please feel free to schedule a follow up appointment to discuss your concerns with your PCP. MEDICATION REFILLS   
 
-- Medication refills should be requested at least 2 business days in advance through your pharmacy. Refills will not be provided by the after hours/on call provider. Introducing [facility] & HEALTH SERVICES! Dear Manoj Walsh: 
Thank you for requesting a [employer] account. Our records indicate that you have previously registered for a [employer] account but its currently inactive. Please call our [employer] support line at [phone]. Additional Information If you have questions, please visit the Frequently Asked Questions section of the [employer] website at [email or web address] Remember, [employer] is NOT to be used for urgent needs. For medical emergencies, dial 911. Now available from your iPhone and Android! Please provide this summary of care documentation to your next provider. Your primary care clinician is listed as Chandrika Mack. If you have any questions after today's visit, please call [phone].

## 2018-04-28 LAB
25(OH)D3+25(OH)D2 SERPL-MCNC: 17.1 NG/ML (ref 30–100)
ALBUMIN SERPL-MCNC: 4.6 G/DL (ref 3.5–5.5)
ALBUMIN/CREAT UR: 17.3 MG/G CREAT (ref 0–30)
ALBUMIN/GLOB SERPL: 1.6 {RATIO} (ref 1.2–2.2)
ALP SERPL-CCNC: 48 IU/L (ref 39–117)
ALT SERPL-CCNC: 41 IU/L (ref 0–32)
AMBIG ABBREV CMP14 DEFAULT,977206: NORMAL
AMBIG ABBREV LP DEFAULT, 977212: NORMAL
APPEARANCE UR: ABNORMAL
AST SERPL-CCNC: 26 IU/L (ref 0–40)
BACTERIA #/AREA URNS HPF: NORMAL /[HPF]
BASOPHILS # BLD AUTO: 0.1 X10E3/UL (ref 0–0.2)
BASOPHILS NFR BLD AUTO: 1 %
BILIRUB SERPL-MCNC: 0.3 MG/DL (ref 0–1.2)
BILIRUB UR QL STRIP: NEGATIVE
BUN SERPL-MCNC: 12 MG/DL (ref 6–20)
BUN/CREAT SERPL: 18 (ref 9–23)
CALCIUM SERPL-MCNC: 9.4 MG/DL (ref 8.7–10.2)
CASTS URNS QL MICRO: NORMAL /LPF
CHLORIDE SERPL-SCNC: 103 MMOL/L (ref 96–106)
CHOLEST SERPL-MCNC: 205 MG/DL (ref 100–199)
CO2 SERPL-SCNC: 19 MMOL/L (ref 18–29)
COLOR UR: YELLOW
CREAT SERPL-MCNC: 0.67 MG/DL (ref 0.57–1)
CREAT UR-MCNC: 222.6 MG/DL
EOSINOPHIL # BLD AUTO: 0.1 X10E3/UL (ref 0–0.4)
EOSINOPHIL NFR BLD AUTO: 1 %
EPI CELLS #/AREA URNS HPF: NORMAL /HPF
ERYTHROCYTE [DISTWIDTH] IN BLOOD BY AUTOMATED COUNT: 12.7 % (ref 12.3–15.4)
EST. AVERAGE GLUCOSE BLD GHB EST-MCNC: 128 MG/DL
GFR SERPLBLD CREATININE-BSD FMLA CKD-EPI: 113 ML/MIN/1.73
GFR SERPLBLD CREATININE-BSD FMLA CKD-EPI: 130 ML/MIN/1.73
GLOBULIN SER CALC-MCNC: 2.8 G/DL (ref 1.5–4.5)
GLUCOSE SERPL-MCNC: 126 MG/DL (ref 65–99)
GLUCOSE UR QL: NEGATIVE
HBA1C MFR BLD: 6.1 % (ref 4.8–5.6)
HCT VFR BLD AUTO: 39 % (ref 34–46.6)
HDLC SERPL-MCNC: 41 MG/DL
HGB BLD-MCNC: 13.7 G/DL (ref 11.1–15.9)
HGB UR QL STRIP: NEGATIVE
IMM GRANULOCYTES # BLD: 0 X10E3/UL (ref 0–0.1)
IMM GRANULOCYTES NFR BLD: 0 %
INTERPRETATION, 910389: NORMAL
KETONES UR QL STRIP: NEGATIVE
LDLC SERPL CALC-MCNC: 132 MG/DL (ref 0–99)
LEUKOCYTE ESTERASE UR QL STRIP: NEGATIVE
LYMPHOCYTES # BLD AUTO: 1.8 X10E3/UL (ref 0.7–3.1)
LYMPHOCYTES NFR BLD AUTO: 27 %
MCH RBC QN AUTO: 32.9 PG (ref 26.6–33)
MCHC RBC AUTO-ENTMCNC: 35.1 G/DL (ref 31.5–35.7)
MCV RBC AUTO: 94 FL (ref 79–97)
MICRO URNS: ABNORMAL
MICRO URNS: ABNORMAL
MICROALBUMIN UR-MCNC: 38.4 UG/ML
MONOCYTES # BLD AUTO: 0.5 X10E3/UL (ref 0.1–0.9)
MONOCYTES NFR BLD AUTO: 7 %
MUCOUS THREADS URNS QL MICRO: PRESENT
NEUTROPHILS # BLD AUTO: 4.1 X10E3/UL (ref 1.4–7)
NEUTROPHILS NFR BLD AUTO: 64 %
NITRITE UR QL STRIP: NEGATIVE
PH UR STRIP: 5 [PH] (ref 5–7.5)
PLATELET # BLD AUTO: 258 X10E3/UL (ref 150–379)
POTASSIUM SERPL-SCNC: 4.7 MMOL/L (ref 3.5–5.2)
PROT SERPL-MCNC: 7.4 G/DL (ref 6–8.5)
PROT UR QL STRIP: NEGATIVE
RBC # BLD AUTO: 4.17 X10E6/UL (ref 3.77–5.28)
RBC #/AREA URNS HPF: NORMAL /HPF
SODIUM SERPL-SCNC: 140 MMOL/L (ref 134–144)
SP GR UR: >=1.03 (ref 1–1.03)
T4 FREE SERPL-MCNC: 1.17 NG/DL (ref 0.82–1.77)
TRIGL SERPL-MCNC: 160 MG/DL (ref 0–149)
TSH SERPL DL<=0.005 MIU/L-ACNC: 1.17 UIU/ML (ref 0.45–4.5)
URINALYSIS REFLEX, 377202: ABNORMAL
UROBILINOGEN UR STRIP-MCNC: 0.2 MG/DL (ref 0.2–1)
VLDLC SERPL CALC-MCNC: 32 MG/DL (ref 5–40)
WBC # BLD AUTO: 6.5 X10E3/UL (ref 3.4–10.8)
WBC #/AREA URNS HPF: NORMAL /HPF

## 2018-04-30 PROBLEM — Z00.00 ROUTINE ADULT HEALTH MAINTENANCE: Status: RESOLVED | Noted: 2017-04-26 | Resolved: 2018-04-30

## 2018-04-30 PROBLEM — R73.02 IGT (IMPAIRED GLUCOSE TOLERANCE): Chronic | Status: ACTIVE | Noted: 2018-04-30

## 2018-04-30 NOTE — PROGRESS NOTES
Call patient. Labs show prediabetes (Hemoglobin A1C 6.1%), mild elevation in ALT (liver marker, likely the result of weight gain), and low vitamin D. Recommend over the counter Vitamin D3 5000 international units by mouth daily. Cholesterol has increased also. Reduce grain based foods, sugar, fried foods and processed foods in diet, increase lean proteins, fresh/frozen unprocessed low starch vegetables and fruits. Exercise daily for minimum of 30 minutes (mix of cardio and weight training).

## 2019-03-05 PROBLEM — E78.00 ELEVATED LDL CHOLESTEROL LEVEL: Chronic | Status: ACTIVE | Noted: 2019-03-05

## 2019-03-06 ENCOUNTER — OFFICE VISIT (OUTPATIENT)
Dept: FAMILY MEDICINE CLINIC | Age: 39
End: 2019-03-06

## 2019-03-06 VITALS
SYSTOLIC BLOOD PRESSURE: 132 MMHG | RESPIRATION RATE: 18 BRPM | WEIGHT: 246 LBS | BODY MASS INDEX: 38.61 KG/M2 | HEIGHT: 67 IN | TEMPERATURE: 97.8 F | DIASTOLIC BLOOD PRESSURE: 84 MMHG | HEART RATE: 80 BPM | OXYGEN SATURATION: 97 %

## 2019-03-06 DIAGNOSIS — Z28.21 INFLUENZA VACCINATION DECLINED BY PATIENT: ICD-10-CM

## 2019-03-06 DIAGNOSIS — R73.02 IGT (IMPAIRED GLUCOSE TOLERANCE): Primary | Chronic | ICD-10-CM

## 2019-03-06 DIAGNOSIS — E11.9 CONTROLLED TYPE 2 DIABETES MELLITUS WITHOUT COMPLICATION, WITHOUT LONG-TERM CURRENT USE OF INSULIN (HCC): Chronic | ICD-10-CM

## 2019-03-06 DIAGNOSIS — Z86.39 HISTORY OF DIABETES MELLITUS, TYPE II: Chronic | ICD-10-CM

## 2019-03-06 DIAGNOSIS — E66.01 SEVERE OBESITY (HCC): ICD-10-CM

## 2019-03-06 DIAGNOSIS — E55.9 VITAMIN D DEFICIENCY: ICD-10-CM

## 2019-03-06 DIAGNOSIS — E78.00 ELEVATED LDL CHOLESTEROL LEVEL: Chronic | ICD-10-CM

## 2019-03-06 RX ORDER — SIMETHICONE 180 MG
CAPSULE ORAL
Qty: 30 CAP | Refills: 0 | Status: SHIPPED | OUTPATIENT
Start: 2019-03-06

## 2019-03-06 NOTE — PROGRESS NOTES
Christy Merritt is a 46 y.o. female (: 1980) presenting to follow up on:    Chief Complaint   Patient presents with    Indigestion       Vitals:    19 0906   BP: 132/84   Pulse: 80   Resp: 18   Temp: 97.8 °F (36.6 °C)   TempSrc: Oral   SpO2: 97%   Weight: 246 lb (111.6 kg)   Height: 5' 7\" (1.702 m)   PainSc:   0 - No pain         Coordination of Care Questionaire:   1. Have you been to the ER, urgent care clinic since your last visit? Hospitalized since your last visit? no    2. Have you seen or consulted any other health care providers outside of the [address] [place] [address] since your last visit? Include any pap smears or colon screening. no    Advanced Directive:   1. Do you have an Advanced Directive? YES    2. Would you like information on Advanced Directives?  NO    Health Maintenance Due   Topic Date Due    PAP AKA CERVICAL CYTOLOGY  2002    Influenza Age 5 to Adult  2018     Doesn't want to get a flu shot, states she never gets them   Has not had pap done

## 2019-03-06 NOTE — PROGRESS NOTES
PRE-VISIT PLANNING:     PATIENT NAME:  Nkechi Louis   :  1980   PCP:  Claudia Branham MD     Future Appointments   Date Time Provider Luis Michael   3/6/2019  9:00 AM Claudia Branham MD JOCELYN Whittington is scheduled for an office visit with Love Gardner MD on 2019 for c/o indigestion. Encounter opened prior to visit to complete pre-visit planning. Last office visit with PCP was 18. Pending issues:  -- Last labs 2018 with HbA1C 6.1%, elevated ALT, low D, increase in lipids. Health Maintenance Due   Topic Date Due    PAP AKA CERVICAL CYTOLOGY  2002       Rooming Nurse:     -- Release for last Pap smear report  -- Offer flu shot per office policy. Document in nursing note if patient declines (document reason) or if clinic is out of stock. If pt reports this season's (Aug 2018-2019) flu shot was administered elsewhere, note both date of admin and clinic/pharmacy that reportedly provided vaccine. Internal Medicine  Acute Care Visit  [address] [address] [address], [place], [place], [facility] [place]  Phone [phone]; Fax [phone]    Date of Service:  2019  Patient's Name & :   Nkechi Louis - 1980   Patient's PCP:  Claudia Branham MD     SUBJECTIVE        Chief Complaint   Patient presents with    Indigestion       Nkechi Louis is a 46 y.o. female complaining of indigestion. Last April had been c/o burping and nausea since pregnancy, had recently stopped lactating, was not having heartburn. Notes same symptoms today. Symptoms can occur regardless of eating or fasting. Does not drink alcohol regularly, does not eat heavily spiced or acidic foods, does not drink coffee.       Patient endorses:  nausea and burping/belching  Patient denies:  abdominal pain, vomiting, unintentional weight loss, diarrhea, constipation, decreased appetite, heartburn/reflux , difficulty swallowing and painful swallowing    Alleviating factors:  nothing - tried TUMS and peppermint    OBJECTIVE    /84 (BP 1 Location: Left arm, BP Patient Position: Sitting)   Pulse 80   Temp 97.8 °F (36.6 °C) (Oral)   Resp 18   Ht 5' 7\" (1.702 m)   Wt 246 lb (111.6 kg)   SpO2 97%   BMI 38.53 kg/m²     Physical Exam   Constitutional: She is oriented to person, place, and time. She appears well-developed and well-nourished. No distress. HENT:   Head: Normocephalic and atraumatic. Right Ear: External ear normal.   Left Ear: External ear normal.   Nose: Nose normal.   Eyes: Conjunctivae and EOM are normal. Right eye exhibits no discharge. Left eye exhibits no discharge. No scleral icterus. Neck: Neck supple. Pulmonary/Chest: Effort normal. No respiratory distress. Abdominal: Soft. There is no tenderness. Neurological: She is alert and oriented to person, place, and time. She exhibits normal muscle tone. Coordination normal.   Skin: Skin is warm and dry. She is not diaphoretic. Psychiatric: She has a normal mood and affect. Her behavior is normal. Judgment and thought content normal.      Nursing notes and vital signs reviewed    ASSESSMENT & PLAN    The primary encounter diagnosis was IGT (impaired glucose tolerance). Diagnoses of Controlled type 2 diabetes mellitus without complication, without long-term current use of insulin ([address] History of diabetes mellitus, type II, Vitamin D deficiency, Elevated LDL cholesterol level, Severe obesity ([address] and Influenza vaccination declined by patient were also pertinent to this visit.    Persistent excessive belching/eruptation and intermittent nausea without emesis with no clear pattern, gas vs GERD (risk factors include obesity)  Treatment plan reviewed with patient, including:    Trial simethicone for gas/belching, if not improved start 6 week trial of OTC PPI, if not improved call for GI referral  Schedule follow up for prediabetes, elevated liver enzyme, vit D deficiency NEC. Complete labs 1 week prior to visit. Additional History   Patient's Past Med Hx, Surg Hx, Soc Hx, Family Hx reviewed. Current Outpatient Medications   Medication Sig    simethicone 180 mg cap 1 capsule twice daily as needed    cholecalciferol (VITAMIN D3) 5,000 unit capsule Take 1 Cap by mouth daily.  PNV No12-Iron-FA-DSS-OM-3 29 mg iron-1 mg -50 mg CPKD Take  by mouth.  ibuprofen (MOTRIN) 200 mg tablet Take  by mouth. No current facility-administered medications for this visit. No Known Allergies    Encounter Diagnoses:     Encounter Diagnoses     ICD-10-CM ICD-9-CM   1. IGT (impaired glucose tolerance) R73.02 790.22   2. Controlled type 2 diabetes mellitus without complication, without long-term current use of insulin (HCC) E11.9 250.00   3. History of diabetes mellitus, type II Z86.39 V12.29   4. Vitamin D deficiency E55.9 268.9   5. Elevated LDL cholesterol level E78.00 272.0   6. Severe obesity ([address] E66.01 278.01   7.  Influenza vaccination declined by patient Z28.21 V64.06

## 2019-03-06 NOTE — PATIENT INSTRUCTIONS
Trial simethicone (Gas-X) for belching. If not helpful after 4-5 days, start a 6 week trial of over the counter omeprazole (branded name Prilosec) or esomeprazole (branded name Nexium)     If not improved after 6 week trial, call office for GI referral.      Call to schedule preventive visit on or after 4/27/18. TESTING RESULTS   Results will be released to [employer]. Normal results will be sent via mail. If you have questions about your results, please schedule a follow up appointment to discuss with your PCP. MEDICATION REFILLS    -- Please allow at least 2 business days for refill requests to be addressed. Medication refills may be requested through your pharmacy. Refills will not be provided by the after hours/on call provider.